# Patient Record
Sex: FEMALE | Race: BLACK OR AFRICAN AMERICAN
[De-identification: names, ages, dates, MRNs, and addresses within clinical notes are randomized per-mention and may not be internally consistent; named-entity substitution may affect disease eponyms.]

---

## 2021-12-07 ENCOUNTER — HOSPITAL ENCOUNTER (OUTPATIENT)
Dept: HOSPITAL 61 - RAD | Age: 41
End: 2021-12-07
Payer: COMMERCIAL

## 2021-12-07 DIAGNOSIS — M54.50: ICD-10-CM

## 2021-12-07 DIAGNOSIS — M79.672: ICD-10-CM

## 2021-12-07 DIAGNOSIS — Z02.71: Primary | ICD-10-CM

## 2021-12-07 DIAGNOSIS — M79.671: ICD-10-CM

## 2021-12-07 PROCEDURE — 72100 X-RAY EXAM L-S SPINE 2/3 VWS: CPT

## 2021-12-07 NOTE — RAD
EXAM:  XR LUMBAR SPINE 2-3V, XR FEET 2 VIEWS 12/7/2021 9:54 AM



CLINICAL INDICATION:  Lateral foot pain and low back pain. Disability determination



COMPARISON:  None



TECHNIQUE:  AP and lateral view of the right and left foot. AP and lateral view of the lumbar spine.



FINDINGS:  



Feet: No acute fracture. Alignment is normal. Joint spaces are maintained. Bone mineralization is nor
mal. No soft tissue abnormality.



Lumbar spine: There are 5 nonrib bearing lumbar vertebral bodies. No acute fracture. Alignment is nor
mal. Disc spaces are maintained. Minimal osteophytes at L2-L3 and L3-L4. No significant facet arthros
is.



IMPRESSION:  

1. No acute osseous abnormality or degenerative changes of the feet.

2. No acute osseous abnormality of the lumbar spine. Minimal degenerative disc disease.



Electronically signed by: Kaity Carroll MD (12/7/2021 5:43 PM) [id number]

## 2021-12-07 NOTE — RAD
EXAM:  XR LUMBAR SPINE 2-3V, XR FEET 2 VIEWS 12/7/2021 9:54 AM



CLINICAL INDICATION:  Lateral foot pain and low back pain. Disability determination



COMPARISON:  None



TECHNIQUE:  AP and lateral view of the right and left foot. AP and lateral view of the lumbar spine.



FINDINGS:  



Feet: No acute fracture. Alignment is normal. Joint spaces are maintained. Bone mineralization is nor
mal. No soft tissue abnormality.



Lumbar spine: There are 5 nonrib bearing lumbar vertebral bodies. No acute fracture. Alignment is nor
mal. Disc spaces are maintained. Minimal osteophytes at L2-L3 and L3-L4. No significant facet arthros
is.



IMPRESSION:  

1. No acute osseous abnormality or degenerative changes of the feet.

2. No acute osseous abnormality of the lumbar spine. Minimal degenerative disc disease.



Electronically signed by: Kaity Carroll MD (12/7/2021 5:43 PM) [id number]